# Patient Record
Sex: MALE | Race: WHITE | HISPANIC OR LATINO | URBAN - METROPOLITAN AREA
[De-identification: names, ages, dates, MRNs, and addresses within clinical notes are randomized per-mention and may not be internally consistent; named-entity substitution may affect disease eponyms.]

---

## 2020-01-14 ENCOUNTER — EMERGENCY (EMERGENCY)
Facility: HOSPITAL | Age: 20
LOS: 1 days | Discharge: HOME | End: 2020-01-14
Attending: EMERGENCY MEDICINE
Payer: COMMERCIAL

## 2020-01-14 VITALS
OXYGEN SATURATION: 99 % | TEMPERATURE: 98 F | WEIGHT: 134.92 LBS | HEIGHT: 66 IN | HEART RATE: 86 BPM | RESPIRATION RATE: 16 BRPM | SYSTOLIC BLOOD PRESSURE: 115 MMHG | DIASTOLIC BLOOD PRESSURE: 76 MMHG

## 2020-01-14 PROCEDURE — 10080 I&D PILONIDAL CYST SIMPLE: CPT

## 2020-01-14 PROCEDURE — 99282 EMERGENCY DEPT VISIT SF MDM: CPT | Mod: 25

## 2020-01-14 PROCEDURE — 99282 EMERGENCY DEPT VISIT SF MDM: CPT

## 2020-01-14 NOTE — ED PROVIDER NOTE - CLINICAL SUMMARY MEDICAL DECISION MAKING FREE TEXT BOX
19 yr old male with no hx presents to ed c/o abscess at right gluteal x 4 days, pain and worsening.  no fever, no chills.    folliculitis/pilonidal cyst. I&D, wound check in 3 days.    sitz bath (soap and water 20 mins 3x/day), warm compress massage area 3x 20 mins.  get a donut seat, wear cotton underwear and keep area dry. watch for infection

## 2020-01-14 NOTE — ED PROVIDER NOTE - PATIENT PORTAL LINK FT
You can access the FollowMyHealth Patient Portal offered by [facility] by registering at the following website: [email or web address]. By joining [employer]’s FollowMyHealth portal, you will also be able to view your health information using other applications (apps) compatible with our system.

## 2020-01-14 NOTE — ED PROVIDER NOTE - OBJECTIVE STATEMENT
19 yr old male with no hx presents to ed c/o abscess at right gluteal x 4 days, pain and worsening.  no fever, no chills.